# Patient Record
Sex: MALE | Race: WHITE | NOT HISPANIC OR LATINO | URBAN - NONMETROPOLITAN AREA
[De-identification: names, ages, dates, MRNs, and addresses within clinical notes are randomized per-mention and may not be internally consistent; named-entity substitution may affect disease eponyms.]

---

## 2020-10-13 ENCOUNTER — OFFICE (OUTPATIENT)
Dept: URBAN - NONMETROPOLITAN AREA CLINIC 13 | Facility: CLINIC | Age: 60
End: 2020-10-13

## 2020-10-13 VITALS
OXYGEN SATURATION: 98 % | HEART RATE: 109 BPM | HEIGHT: 73 IN | DIASTOLIC BLOOD PRESSURE: 94 MMHG | WEIGHT: 262 LBS | SYSTOLIC BLOOD PRESSURE: 150 MMHG

## 2020-10-13 DIAGNOSIS — K74.60 UNSPECIFIED CIRRHOSIS OF LIVER: ICD-10-CM

## 2020-10-13 DIAGNOSIS — R10.13 EPIGASTRIC PAIN: ICD-10-CM

## 2020-10-13 DIAGNOSIS — R93.5 ABNORMAL FINDINGS ON DIAGNOSTIC IMAGING OF OTHER ABDOMINAL R: ICD-10-CM

## 2020-10-13 DIAGNOSIS — Z12.11 ENCOUNTER FOR SCREENING FOR MALIGNANT NEOPLASM OF COLON: ICD-10-CM

## 2020-10-13 DIAGNOSIS — B18.2 CHRONIC VIRAL HEPATITIS C: ICD-10-CM

## 2020-10-13 PROCEDURE — 99203 OFFICE O/P NEW LOW 30 MIN: CPT

## 2020-10-15 LAB
ALPHA FETOPROTEIN, TUMOR MARKER: 8.5 NG/ML — HIGH (ref ?–6.1)
AMYLASE: 66 UNITS/L (ref 30–110)
CBC WITH WBC (DIFF): ACANTHOCYTE: NORMAL
CBC WITH WBC (DIFF): AGRANULAR NEUTROPHIL: NORMAL
CBC WITH WBC (DIFF): ANISOCYTOSIS: NORMAL
CBC WITH WBC (DIFF): ATYPICAL LYMPHOCYTE: NORMAL
CBC WITH WBC (DIFF): AUER RODS: NORMAL
CBC WITH WBC (DIFF): BAND: NORMAL
CBC WITH WBC (DIFF): BASOPHIL: 1 % (ref 0–1)
CBC WITH WBC (DIFF): BASOPHILIC STIPPLING: NORMAL
CBC WITH WBC (DIFF): BI-LOBED NEUTROPHIL: NORMAL
CBC WITH WBC (DIFF): BLAST: NORMAL
CBC WITH WBC (DIFF): BURR CELL: NORMAL
CBC WITH WBC (DIFF): DIMORPHIC RBC POPULATION: NORMAL
CBC WITH WBC (DIFF): DOHLE BODIES: NORMAL
CBC WITH WBC (DIFF): ELLIPTOCYTE: NORMAL
CBC WITH WBC (DIFF): EOSINOPHIL: 5 % (ref 0–5)
CBC WITH WBC (DIFF): GIANT PLATELET: NORMAL
CBC WITH WBC (DIFF): HEMATOCRIT: 42 % (ref 41–53)
CBC WITH WBC (DIFF): HEMOGLOBIN: 13.3 G/DL — LOW (ref 14–18)
CBC WITH WBC (DIFF): HOWELL JOLLY BODIES: NORMAL
CBC WITH WBC (DIFF): HYPERSEGMENTED NEUTROPHIL: NORMAL
CBC WITH WBC (DIFF): HYPOCHROMIA: NORMAL
CBC WITH WBC (DIFF): HYPOGRANULAR NEUTROPHIL: NORMAL
CBC WITH WBC (DIFF): IMMATURE GRANULOCYTES: 1 % (ref 0–1)
CBC WITH WBC (DIFF): LARGE PLATELET: NORMAL
CBC WITH WBC (DIFF): LYMPHOCYTE: 20 % (ref 20–40)
CBC WITH WBC (DIFF): MACROCYTOSIS: NORMAL
CBC WITH WBC (DIFF): MEAN CELL HEMOGLOBIN CONCENTRATION: 31.7 G/DL — LOW (ref 33–37)
CBC WITH WBC (DIFF): MEAN CELL HEMOGLOBIN: 29.8 PG (ref 27–31)
CBC WITH WBC (DIFF): MEAN CELL VOLUME: 94 FL (ref 84–100)
CBC WITH WBC (DIFF): MEAN PLATELET VOLUME: 10.8 FL (ref 8.3–11.9)
CBC WITH WBC (DIFF): METAMYELOCYTE: NORMAL
CBC WITH WBC (DIFF): MICROCYTOSIS: NORMAL
CBC WITH WBC (DIFF): MIX CELLS: NORMAL
CBC WITH WBC (DIFF): MONOCYTE: 8 % (ref 1–10)
CBC WITH WBC (DIFF): MYELOCYTE: NORMAL
CBC WITH WBC (DIFF): NEUTROPHIL SEGMENTED: 66 % (ref 50–70)
CBC WITH WBC (DIFF): NOTE: NORMAL
CBC WITH WBC (DIFF): NUCLEATED RBC: 0 /100WBC (ref 0–0)
CBC WITH WBC (DIFF): OVALOCYTE: NORMAL
CBC WITH WBC (DIFF): PAPPENHEIMER BODIES: NORMAL
CBC WITH WBC (DIFF): PATHOLOGIST INTERPRETATION: NORMAL
CBC WITH WBC (DIFF): PLATELET CLUMPS: NORMAL
CBC WITH WBC (DIFF): PLATELET COUNT: 222 /NL (ref 140–440)
CBC WITH WBC (DIFF): PLT SATELLITOSIS: NORMAL
CBC WITH WBC (DIFF): POIKILOCYTOSIS: NORMAL
CBC WITH WBC (DIFF): POLYCHROMASIA: NORMAL
CBC WITH WBC (DIFF): PROMYELOCYTE: NORMAL
CBC WITH WBC (DIFF): RBC MORPHOLOGY: NORMAL
CBC WITH WBC (DIFF): REACT LYMPH ABS MAN: NORMAL
CBC WITH WBC (DIFF): REACTIVE LYMPHOCYTE: NORMAL
CBC WITH WBC (DIFF): RED BLOOD CELL: 4.46 /PL — LOW (ref 4.7–6.1)
CBC WITH WBC (DIFF): RED CELL DIAMETER WIDTH: 45 FL (ref 35–48)
CBC WITH WBC (DIFF): ROULEAUX RBC: NORMAL
CBC WITH WBC (DIFF): SCHISTOCYTE: NORMAL
CBC WITH WBC (DIFF): SICKLE CELL: NORMAL
CBC WITH WBC (DIFF): SMUDGE CELLS: NORMAL
CBC WITH WBC (DIFF): SPHEROCYTE: NORMAL
CBC WITH WBC (DIFF): STOMATOCYTE: NORMAL
CBC WITH WBC (DIFF): TARGET CELL: NORMAL
CBC WITH WBC (DIFF): TEAR DROP CELL: NORMAL
CBC WITH WBC (DIFF): TOXIC GRANULATION: NORMAL
CBC WITH WBC (DIFF): UNCLASSIFIED CELL: NORMAL
CBC WITH WBC (DIFF): VACUOLATED NEUTROPHIL: NORMAL
CBC WITH WBC (DIFF): WBC MORPHOLOGY: NORMAL
CBC WITH WBC (DIFF): WHITE BLOOD CELL: 7.2 /NL (ref 4.8–11)
COMPREHENSIVE METABOLIC PANEL: ALBUMIN: 4.2 G/DL (ref 3.5–4.8)
COMPREHENSIVE METABOLIC PANEL: ALKALINE PHOSPHATASE: 100 UNITS/L (ref 36–126)
COMPREHENSIVE METABOLIC PANEL: ALT: 30 UNITS/L (ref ?–49)
COMPREHENSIVE METABOLIC PANEL: ANION GAP: 12 MMOL/L (ref 7–16)
COMPREHENSIVE METABOLIC PANEL: AST: 38 UNITS/L (ref 17–59)
COMPREHENSIVE METABOLIC PANEL: BILIRUBIN, TOTAL: 0.6 MG/DL (ref 0.2–1.3)
COMPREHENSIVE METABOLIC PANEL: BUN/CREATININE RATIO: 8.5
COMPREHENSIVE METABOLIC PANEL: CALCIUM: 9.4 MG/DL (ref 8.4–10.2)
COMPREHENSIVE METABOLIC PANEL: CARBON DIOXIDE: 29 MMOL/L (ref 22–30)
COMPREHENSIVE METABOLIC PANEL: CHLORIDE: 97 MMOL/L — LOW (ref 98–107)
COMPREHENSIVE METABOLIC PANEL: CREATININE: 0.82 MG/DL (ref 0.66–1.25)
COMPREHENSIVE METABOLIC PANEL: GLUCOSE: 141 MG/DL — HIGH (ref 75–110)
COMPREHENSIVE METABOLIC PANEL: POTASSIUM: 5.2 MMOL/L (ref 3.5–5.3)
COMPREHENSIVE METABOLIC PANEL: PROTEIN, TOTAL, SERUM: 7.3 G/DL (ref 6.3–8.2)
COMPREHENSIVE METABOLIC PANEL: SODIUM: 138 MMOL/L (ref 137–145)
COMPREHENSIVE METABOLIC PANEL: UREA NITROGEN (BUN): 7 MG/DL — LOW (ref 9–20)
GFR: EGFR AFRICAN AMERICAN: >60 ML/MIN/1.73M2
GFR: EGFR NON-AFR.AMERICAN: >60 ML/MIN/1.73M2
HCV RNA, QUANTITATIVE REAL TIME PCR: 5.96 LOG IU/ML — HIGH
HCV RNA, QUANTITATIVE REAL TIME PCR: HIGH IU/ML
HEPATITIS PANEL, ACUTE W/REFLEX TO CONFIRMATION: CONFIRMATION: NORMAL
HEPATITIS PANEL, ACUTE W/REFLEX TO CONFIRMATION: HEPATITIS A IGM: NORMAL
HEPATITIS PANEL, ACUTE W/REFLEX TO CONFIRMATION: HEPATITIS B CORE ANTIBODY (IGM): NORMAL
HEPATITIS PANEL, ACUTE W/REFLEX TO CONFIRMATION: HEPATITIS B SURFACE ANTIGEN: NORMAL
HEPATITIS PANEL, ACUTE W/REFLEX TO CONFIRMATION: HEPATITIS C ANTIBODY: REACTIVE
HEPATITIS PANEL, ACUTE W/REFLEX TO CONFIRMATION: SIGNAL TO CUT-OFF: 30.3 — HIGH (ref ?–1)
LIPASE: 148 UNITS/L (ref 23–300)

## 2025-03-18 ENCOUNTER — OFFICE (OUTPATIENT)
Dept: URBAN - METROPOLITAN AREA CLINIC 11 | Facility: CLINIC | Age: 65
End: 2025-03-18
Payer: MEDICARE

## 2025-03-18 VITALS
DIASTOLIC BLOOD PRESSURE: 88 MMHG | OXYGEN SATURATION: 95 % | DIASTOLIC BLOOD PRESSURE: 100 MMHG | SYSTOLIC BLOOD PRESSURE: 154 MMHG | SYSTOLIC BLOOD PRESSURE: 135 MMHG | WEIGHT: 254 LBS | HEIGHT: 73 IN | HEART RATE: 100 BPM

## 2025-03-18 DIAGNOSIS — B18.2 CHRONIC VIRAL HEPATITIS C: ICD-10-CM

## 2025-03-18 DIAGNOSIS — Z85.038 PERSONAL HISTORY OF OTHER MALIGNANT NEOPLASM OF LARGE INTEST: ICD-10-CM

## 2025-03-18 DIAGNOSIS — K74.60 UNSPECIFIED CIRRHOSIS OF LIVER: ICD-10-CM

## 2025-03-18 PROCEDURE — 99204 OFFICE O/P NEW MOD 45 MIN: CPT | Performed by: NURSE PRACTITIONER

## 2025-03-18 RX ORDER — SODIUM SULFATE, POTASSIUM SULFATE, MAGNESIUM SULFATE 1.6; 3.13; 17.5 G/ML; G/ML; G/ML
SOLUTION, CONCENTRATE ORAL
Qty: 1 | Refills: 0 | Status: ACTIVE
Start: 2025-03-18

## 2025-03-18 NOTE — SERVICENOTES
patient states Dr. Aguilera stated no cirrhosis of the liver.  He was treated successfully with Mavyret for hepatitis-C per the patient's records.  Need all records.

## 2025-03-18 NOTE — SERVICEHPINOTES
Mr. Real is a pleasant 64-year-old  male who presents today accompanied by his wife from [place], [place] due to history of colon cancer noted per colonoscopy a year and a half ago.  He was referred to surgeon-Dr. Aguilera   And he underwent hemicolectomy with anastomosis.  Need records for confirmation.  He also states he had double hernia repair surgery the afterwards.  He states his most recent PET scan 3 months ago was completely unrevealing and he has been in remission.  He did not require chemoradiation.  Need all records.  He states he is overdue for surveillance colonoscopy due to colon cancer.  He states he is having regular bowel movements and denies any melena, rectal bleeding, hematochezia.  No abdominal pain.  Also, he has a history of hepatitis-C in 2020 was treated with Mavyret for 8 weeks.  he states his viral load has been undetectable.  Again, need all the records.  We are missing a lot of records.   the patient states that surgery ruled out cirrhosis of the liver. Most recent CT or any imaging that we have is from 2020 for right-sided abdominal pain. The liver appeared normal on this imaging. Did have a mildly distended gallbladder with cholelithiasis and pericholecystic fat stranding adjacent to the fundus. Patient states he underwent cholecystectomy. 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

## 2025-05-08 ENCOUNTER — OFFICE (OUTPATIENT)
Dept: URBAN - METROPOLITAN AREA PATHOLOGY 12 | Facility: PATHOLOGY | Age: 65
End: 2025-05-08
Payer: MEDICARE

## 2025-05-08 DIAGNOSIS — K62.1 RECTAL POLYP: ICD-10-CM

## 2025-05-08 DIAGNOSIS — D12.4 BENIGN NEOPLASM OF DESCENDING COLON: ICD-10-CM

## 2025-05-08 DIAGNOSIS — D12.2 BENIGN NEOPLASM OF ASCENDING COLON: ICD-10-CM

## 2025-05-08 DIAGNOSIS — D12.3 BENIGN NEOPLASM OF TRANSVERSE COLON: ICD-10-CM

## 2025-05-08 DIAGNOSIS — D12.5 BENIGN NEOPLASM OF SIGMOID COLON: ICD-10-CM

## 2025-05-08 DIAGNOSIS — Z12.11 ENCOUNTER FOR SCREENING FOR MALIGNANT NEOPLASM OF COLON: ICD-10-CM

## 2025-05-08 PROBLEM — K63.5 POLYP OF COLON: Status: ACTIVE | Noted: 2025-05-08

## 2025-05-08 PROCEDURE — 88305 TISSUE EXAM BY PATHOLOGIST: CPT | Performed by: STUDENT IN AN ORGANIZED HEALTH CARE EDUCATION/TRAINING PROGRAM
